# Patient Record
Sex: FEMALE | Race: WHITE | NOT HISPANIC OR LATINO | Employment: UNEMPLOYED | URBAN - METROPOLITAN AREA
[De-identification: names, ages, dates, MRNs, and addresses within clinical notes are randomized per-mention and may not be internally consistent; named-entity substitution may affect disease eponyms.]

---

## 2017-01-17 ENCOUNTER — APPOINTMENT (OUTPATIENT)
Dept: WOMENS IMAGING | Facility: HOSPITAL | Age: 52
End: 2017-01-17

## 2017-01-17 PROCEDURE — 76641 ULTRASOUND BREAST COMPLETE: CPT | Performed by: RADIOLOGY

## 2017-09-11 ENCOUNTER — APPOINTMENT (OUTPATIENT)
Dept: WOMENS IMAGING | Facility: HOSPITAL | Age: 52
End: 2017-09-11

## 2017-09-11 PROCEDURE — 77063 BREAST TOMOSYNTHESIS BI: CPT | Performed by: RADIOLOGY

## 2017-09-11 PROCEDURE — G0202 SCR MAMMO BI INCL CAD: HCPCS | Performed by: RADIOLOGY

## 2017-09-20 ENCOUNTER — APPOINTMENT (OUTPATIENT)
Dept: WOMENS IMAGING | Facility: HOSPITAL | Age: 52
End: 2017-09-20

## 2017-09-20 PROCEDURE — G0206 DX MAMMO INCL CAD UNI: HCPCS | Performed by: RADIOLOGY

## 2019-09-17 ENCOUNTER — OFFICE VISIT (OUTPATIENT)
Dept: GASTROENTEROLOGY | Facility: CLINIC | Age: 54
End: 2019-09-17

## 2019-09-17 VITALS
WEIGHT: 115.8 LBS | SYSTOLIC BLOOD PRESSURE: 116 MMHG | DIASTOLIC BLOOD PRESSURE: 78 MMHG | HEIGHT: 67 IN | TEMPERATURE: 99 F | BODY MASS INDEX: 18.18 KG/M2

## 2019-09-17 DIAGNOSIS — R19.7 DIARRHEA, UNSPECIFIED TYPE: Primary | ICD-10-CM

## 2019-09-17 PROCEDURE — 99203 OFFICE O/P NEW LOW 30 MIN: CPT | Performed by: INTERNAL MEDICINE

## 2019-09-17 RX ORDER — MINOCYCLINE HYDROCHLORIDE 65 MG/1
TABLET, FILM COATED, EXTENDED RELEASE ORAL
COMMUNITY
Start: 2019-08-05

## 2019-09-17 RX ORDER — SPIRONOLACTONE 100 MG/1
TABLET, FILM COATED ORAL
COMMUNITY
Start: 2016-10-28

## 2019-09-17 RX ORDER — MULTIVIT WITH MINERALS/LUTEIN
1000 TABLET ORAL DAILY
COMMUNITY

## 2019-09-17 RX ORDER — DIPHENOXYLATE HYDROCHLORIDE AND ATROPINE SULFATE 2.5; .025 MG/1; MG/1
1 TABLET ORAL AS NEEDED
COMMUNITY

## 2019-09-17 RX ORDER — BIOTIN 1 MG
1000 TABLET ORAL DAILY
COMMUNITY

## 2019-09-17 RX ORDER — BUSPIRONE HYDROCHLORIDE 10 MG/1
TABLET ORAL
COMMUNITY
Start: 2019-08-22

## 2019-09-17 RX ORDER — LEVOTHYROXINE, LIOTHYRONINE 38; 9 UG/1; UG/1
TABLET ORAL
COMMUNITY
Start: 2019-08-26

## 2019-09-17 NOTE — PROGRESS NOTES
Chief Complaint   Patient presents with   • Fecal Incontinence   • Change in bowels     Leigh A McArdle is a 53 y.o. female who presents with a history of loose stools and fecal incontinence  HPI     Patient 53-year-old female with history of anxiety and migraines reporting distant history of chronic diarrhea and fecal incontinence.  Patient reports 20 years ago after the birth of her last child patient developed the symptoms felt related toThe birth of her last child.  Patient underwent reconstruction surgery of the rectal tissue because of that injury.  Patient now reports last few months developing recurrent loose stools and rectal discharge patient denies nausea vomiting no bright red blood per rectum or melena seen.  Patient here for further recommendations.    Past Medical History:   Diagnosis Date   • Anxiety    • Migraines        Current Outpatient Medications:   •  Biotin 1000 MCG tablet, Take 1,000 mcg by mouth Daily., Disp: , Rfl:   •  busPIRone (BUSPAR) 10 MG tablet, , Disp: , Rfl:   •  Cholecalciferol (VITAMIN D) 400 UNIT/ML liquid, Take  by mouth Daily., Disp: , Rfl:   •  diphenoxylate-atropine (LOMOTIL) 2.5-0.025 MG per tablet, Take 1 tablet by mouth As Needed for Diarrhea., Disp: , Rfl:   •  IODINE-VITAMIN A PO, Take  by mouth Daily., Disp: , Rfl:   •  Minocycline HCl ER 65 MG tablet sustained-release 24 hour, , Disp: , Rfl:   •  NP THYROID 60 MG tablet, , Disp: , Rfl:   •  spironolactone (ALDACTONE) 100 MG tablet, TK 1 T PO BID, Disp: , Rfl:   •  topiramate (TOPAMAX) 200 MG tablet, , Disp: , Rfl:   •  vitamin E 1000 UNIT capsule, Take 1,000 Units by mouth Daily., Disp: , Rfl:   No Known Allergies  Social History     Socioeconomic History   • Marital status:      Spouse name: Not on file   • Number of children: Not on file   • Years of education: Not on file   • Highest education level: Not on file   Tobacco Use   • Smoking status: Former Smoker   • Smokeless tobacco: Never Used   • Tobacco  comment: quit Dec 2018   Substance and Sexual Activity   • Alcohol use: Yes     Comment: occ    • Drug use: No     History reviewed. No pertinent family history.  Review of Systems   Constitutional: Negative.    HENT: Negative.    Eyes: Negative.    Respiratory: Negative.    Cardiovascular: Negative.    Gastrointestinal: Positive for diarrhea. Negative for abdominal distention, abdominal pain, anal bleeding, blood in stool, constipation, nausea, rectal pain and vomiting.   Endocrine: Negative.    Musculoskeletal: Negative.    Skin: Negative.    Allergic/Immunologic: Negative.    Hematological: Negative.      Vitals:    09/17/19 1333   BP: 116/78   Temp: 99 °F (37.2 °C)     Physical Exam   Constitutional: She appears well-developed and well-nourished.   HENT:   Head: Normocephalic and atraumatic.   Eyes: Pupils are equal, round, and reactive to light. No scleral icterus.   Neck: Normal range of motion.   Cardiovascular: Normal rate, regular rhythm and normal heart sounds. Exam reveals no gallop and no friction rub.   No murmur heard.  Pulmonary/Chest: Effort normal and breath sounds normal. She has no wheezes. She has no rales.   Abdominal: Soft. Bowel sounds are normal. She exhibits no shifting dullness, no distension, no pulsatile liver, no fluid wave, no abdominal bruit, no ascites, no pulsatile midline mass and no mass. There is no hepatosplenomegaly. There is no tenderness. There is no rigidity and no guarding. No hernia.   Musculoskeletal: Normal range of motion. She exhibits no edema.   Lymphadenopathy:     She has no cervical adenopathy.   Neurological: She is alert. No cranial nerve deficit.   Skin: Skin is warm and dry. No rash noted.   Psychiatric: She has a normal mood and affect. Her behavior is normal. Thought content normal.   Nursing note and vitals reviewed.    Diagnoses and all orders for this visit:    Diarrhea, unspecified type    Other orders  -     spironolactone (ALDACTONE) 100 MG tablet; TK 1  T PO BID  -     NP THYROID 60 MG tablet  -     topiramate (TOPAMAX) 200 MG tablet  -     Minocycline HCl ER 65 MG tablet sustained-release 24 hour  -     busPIRone (BUSPAR) 10 MG tablet  -     diphenoxylate-atropine (LOMOTIL) 2.5-0.025 MG per tablet; Take 1 tablet by mouth As Needed for Diarrhea.  -     vitamin E 1000 UNIT capsule; Take 1,000 Units by mouth Daily.  -     Biotin 1000 MCG tablet; Take 1,000 mcg by mouth Daily.  -     Cholecalciferol (VITAMIN D) 400 UNIT/ML liquid; Take  by mouth Daily.  -     IODINE-VITAMIN A PO; Take  by mouth Daily.    Patient 53-year-old female reports history of chronic loose stools with recent fecal incontinence.  Patient reports had similar issues about 20 years ago requiring rectal reconstruction after birth of her last child.  Patient reports recurrent symptoms now with recurrent rectal discharge with partial fecal incontinence.  Patient denies bright red blood per rectum or melena uses Lomotil in order to make the stool solid to prevent leakage.  At this point would recommend proceeding with colonoscopy to evaluate the colonic mucosa for possible biopsy for microscopic colitis.  If negative would recommend evaluation by rectal surgery including anorectal manometry.

## 2020-12-15 ENCOUNTER — HOSPITAL ENCOUNTER (EMERGENCY)
Facility: HOSPITAL | Age: 55
Discharge: LEFT BEFORE DISCHARGE | End: 2020-12-15

## 2020-12-15 VITALS
OXYGEN SATURATION: 100 % | TEMPERATURE: 99.9 F | SYSTOLIC BLOOD PRESSURE: 147 MMHG | RESPIRATION RATE: 16 BRPM | HEART RATE: 113 BPM | DIASTOLIC BLOOD PRESSURE: 91 MMHG | HEIGHT: 67 IN | BODY MASS INDEX: 18.14 KG/M2

## 2020-12-16 NOTE — ED TRIAGE NOTES
"To ER via [facility].  Pt received \"steroid shot\" right dorsalgluteal  for sciatic pain. C/o bruising tonight to rt hip, thigh, and back of knee.      Pt in mask at time of triage.  Triage staff in appropriate PPE.  "

## 2021-01-11 ENCOUNTER — APPOINTMENT (OUTPATIENT)
Dept: WOMENS IMAGING | Facility: HOSPITAL | Age: 56
End: 2021-01-11

## 2021-01-11 PROCEDURE — 77063 BREAST TOMOSYNTHESIS BI: CPT | Performed by: RADIOLOGY

## 2021-01-11 PROCEDURE — 77067 SCR MAMMO BI INCL CAD: CPT | Performed by: RADIOLOGY

## 2022-07-12 ENCOUNTER — APPOINTMENT (OUTPATIENT)
Dept: WOMENS IMAGING | Facility: HOSPITAL | Age: 57
End: 2022-07-12

## 2022-07-12 PROCEDURE — 77063 BREAST TOMOSYNTHESIS BI: CPT | Performed by: RADIOLOGY

## 2022-07-12 PROCEDURE — 77067 SCR MAMMO BI INCL CAD: CPT | Performed by: RADIOLOGY

## 2023-05-24 ENCOUNTER — HOSPITAL ENCOUNTER (OUTPATIENT)
Dept: GENERAL RADIOLOGY | Facility: HOSPITAL | Age: 58
Discharge: HOME | End: 2023-05-24
Admitting: FAMILY MEDICINE
Payer: MEDICARE

## 2023-05-24 DIAGNOSIS — M25.511 RIGHT SHOULDER PAIN, UNSPECIFIED CHRONICITY: ICD-10-CM

## 2023-05-24 PROCEDURE — 73060 X-RAY EXAM OF HUMERUS: CPT

## 2024-01-09 ENCOUNTER — HOSPITAL ENCOUNTER (OUTPATIENT)
Dept: GENERAL RADIOLOGY | Facility: HOSPITAL | Age: 59
Discharge: HOME | End: 2024-01-09
Payer: MEDICARE

## 2024-01-09 DIAGNOSIS — M54.16 LUMBAR RADICULOPATHY: ICD-10-CM

## 2024-01-09 PROCEDURE — 72100 X-RAY EXAM L-S SPINE 2/3 VWS: CPT

## 2024-01-09 PROCEDURE — 72070 X-RAY EXAM THORAC SPINE 2VWS: CPT
